# Patient Record
Sex: MALE | Race: WHITE | Employment: FULL TIME | URBAN - METROPOLITAN AREA
[De-identification: names, ages, dates, MRNs, and addresses within clinical notes are randomized per-mention and may not be internally consistent; named-entity substitution may affect disease eponyms.]

---

## 2022-07-22 ENCOUNTER — HOSPITAL ENCOUNTER (EMERGENCY)
Age: 32
Discharge: HOME | End: 2022-07-22
Attending: EMERGENCY MEDICINE
Payer: COMMERCIAL

## 2022-07-22 VITALS
HEIGHT: 72 IN | RESPIRATION RATE: 18 BRPM | WEIGHT: 272 LBS | HEART RATE: 87 BPM | BODY MASS INDEX: 36.84 KG/M2 | SYSTOLIC BLOOD PRESSURE: 152 MMHG | OXYGEN SATURATION: 97 % | DIASTOLIC BLOOD PRESSURE: 86 MMHG | TEMPERATURE: 98.2 F

## 2022-07-22 DIAGNOSIS — R21 RASH AND OTHER NONSPECIFIC SKIN ERUPTION: Primary | ICD-10-CM

## 2022-07-22 PROCEDURE — 99282 EMERGENCY DEPT VISIT SF MDM: CPT

## 2022-07-22 ASSESSMENT — PAIN - FUNCTIONAL ASSESSMENT: PAIN_FUNCTIONAL_ASSESSMENT: NONE - DENIES PAIN

## 2022-07-22 NOTE — ED NOTES
Monkey pox swabs collected from the Lt and Rt arms. Sent to Lab.       Janene Grijalva RN  07/22/22 8145

## 2022-07-22 NOTE — ED PROVIDER NOTES
EMERGENCY DEPARTMENT ENCOUNTER    Pt Name: Sophie Rhoades  MRN: [id number]  [place] 1990  Date of evaluation: 7/22/22  CHIEF COMPLAINT       Chief Complaint   Patient presents with    Other     Monkey pox     HISTORY OF PRESENT ILLNESS   Patient is a 49-year-old male who was sent by health department for evaluation of fever, rash and to rule out monkey pox. Patient developed fever last week. Fever finally broke however approximately 5 days ago he developed rash to arms and legs bilaterally. Rash not painful or itchy. No lesions in mouth or palms or soles of feet. Patient does not believe he was exposed to monkey poxvirus. He is otherwise healthy. Does not take prescription medications. He is sexually active, monogamous with his wife. No other issues at this time. ROS:  No cough, shortness of breath, chest pain, abdominal pain, nausea, vomiting, changes in urine or stool. REVIEW OF SYSTEMS     Review of Systems   All other systems reviewed and are negative. PASTMEDICAL HISTORY   History reviewed. No pertinent past medical history. SURGICAL HISTORY     History reviewed. No pertinent surgical history. CURRENT MEDICATIONS       Previous Medications    No medications on file     ALLERGIES     has No Known Allergies. FAMILY HISTORY     has no family status information on file. SOCIAL HISTORY       Social History     Tobacco Use    Smoking status: Never    Smokeless tobacco: Never   Substance Use Topics    Drug use: Never     PHYSICAL EXAM     INITIAL VITALS: BP (!) 152/86   Pulse 87   Temp 98.2 °F (36.8 °C) (Oral)   Resp 18   Ht 6' (1.829 m)   Wt 272 lb (123.4 kg)   SpO2 97%   BMI 36.89 kg/m²    Physical Exam  HENT:      Head: Normocephalic. Right Ear: External ear normal.      Left Ear: External ear normal.      Nose: Nose normal.      Mouth/Throat:      Mouth: Mucous membranes are moist.      Pharynx: Oropharynx is clear.    Eyes:      Conjunctiva/sclera: Conjunctivae normal. Cardiovascular:      Rate and Rhythm: Normal rate. Pulmonary:      Effort: Pulmonary effort is normal.   Abdominal:      General: Abdomen is flat. Skin:     General: Skin is dry. Comments: Multiple raised red papules on forearms and legs bilaterally. None are crusted over. Neurological:      Mental Status: He is alert. Mental status is at baseline. Psychiatric:         Mood and Affect: Mood normal.         Behavior: Behavior normal.       MEDICAL DECISION MAKING:   The patient is hemodynamically stable, afebrile, nontoxic-appearing. Physical exam notable for multiple raised red papules on forearms and legs bilaterally. Based on history and exam differential is viral exanthema, also consider monkey poxvirus. ED plan for swab lesions, sent to health department, discharged home for isolation until results are obtained. DIAGNOSTIC RESULTS   EKG:All EKG's are interpreted by the Emergency Department Physician who either signs or Co-signs this chart in the absence of a cardiologist.        RADIOLOGY:All plain film, CT, MRI, and formal ultrasound images (except ED bedside ultrasound) are read by the radiologist, see reports below, unless otherwisenoted in MDM or here. No orders to display     LABS: All lab results were reviewed by myself, and all abnormals are listed below. Labs Reviewed - No data to display    EMERGENCY DEPARTMENTCOURSE:         Vitals:    Vitals:    07/22/22 1439   BP: (!) 152/86   Pulse: 87   Resp: 18   Temp: 98.2 °F (36.8 °C)   TempSrc: Oral   SpO2: 97%   Weight: 272 lb (123.4 kg)   Height: 6' (1.829 m)       The patient was given the following medications while in the emergency department:  No orders of the defined types were placed in this encounter. CONSULTS:  None    FINAL IMPRESSION      1.  Rash and other nonspecific skin eruption          DISPOSITION/PLAN   DISPOSITION Decision To Discharge 07/22/2022 03:30:00 PM      PATIENT REFERRED TO:   Chinmay Loomis [address] [address]  [phone]    In 2 days    DISCHARGE MEDICATIONS:  New Prescriptions    No medications on file     Chucky Perera MD  Attending Emergency Physician                    Rebecca Singh MD  07/22/22 [id number]

## 2022-07-22 NOTE — DISCHARGE INSTRUCTIONS
Expect a phone call from the health department of your test results. The CDC recommends staying home except to receive medical care, isolate in her room or area separate from others, and limit contact with other family members and pets when possible. Isolation precaution should be maintained until all lesions have crusted, crust have , and a fresh layer of skin has formed underneath. Return to this emergency room immediately if your symptoms persist, worsen or if new ones form. Make sure you follow-up with your primary care doctor within the next 1-2 business days.

## 2022-07-25 LAB
SEND OUT REPORT: NORMAL
TEST NAME: NORMAL